# Patient Record
Sex: MALE | Race: WHITE | NOT HISPANIC OR LATINO | URBAN - METROPOLITAN AREA
[De-identification: names, ages, dates, MRNs, and addresses within clinical notes are randomized per-mention and may not be internally consistent; named-entity substitution may affect disease eponyms.]

---

## 2023-04-27 ENCOUNTER — APPOINTMENT (OUTPATIENT)
Dept: RADIOLOGY | Facility: MEDICAL CENTER | Age: 53
End: 2023-04-27
Attending: EMERGENCY MEDICINE

## 2023-04-27 ENCOUNTER — HOSPITAL ENCOUNTER (EMERGENCY)
Facility: MEDICAL CENTER | Age: 53
End: 2023-04-28
Attending: EMERGENCY MEDICINE
Payer: COMMERCIAL

## 2023-04-27 DIAGNOSIS — F10.920 ALCOHOLIC INTOXICATION WITHOUT COMPLICATION (HCC): ICD-10-CM

## 2023-04-27 PROCEDURE — 99285 EMERGENCY DEPT VISIT HI MDM: CPT

## 2023-04-27 PROCEDURE — 94760 N-INVAS EAR/PLS OXIMETRY 1: CPT

## 2023-04-27 PROCEDURE — 70450 CT HEAD/BRAIN W/O DYE: CPT

## 2023-04-27 ASSESSMENT — FIBROSIS 4 INDEX: FIB4 SCORE: 0.97

## 2023-04-28 VITALS
BODY MASS INDEX: 30.06 KG/M2 | SYSTOLIC BLOOD PRESSURE: 133 MMHG | OXYGEN SATURATION: 96 % | TEMPERATURE: 98.9 F | DIASTOLIC BLOOD PRESSURE: 98 MMHG | WEIGHT: 210 LBS | HEIGHT: 70 IN | HEART RATE: 103 BPM | RESPIRATION RATE: 18 BRPM

## 2023-04-28 PROCEDURE — A9270 NON-COVERED ITEM OR SERVICE: HCPCS | Performed by: EMERGENCY MEDICINE

## 2023-04-28 PROCEDURE — 96374 THER/PROPH/DIAG INJ IV PUSH: CPT

## 2023-04-28 PROCEDURE — 700111 HCHG RX REV CODE 636 W/ 250 OVERRIDE (IP): Performed by: EMERGENCY MEDICINE

## 2023-04-28 PROCEDURE — 700102 HCHG RX REV CODE 250 W/ 637 OVERRIDE(OP): Performed by: EMERGENCY MEDICINE

## 2023-04-28 RX ORDER — LORAZEPAM 1 MG/1
0.5 TABLET ORAL EVERY 4 HOURS PRN
Status: DISCONTINUED | OUTPATIENT
Start: 2023-04-28 | End: 2023-04-28 | Stop reason: HOSPADM

## 2023-04-28 RX ORDER — LORAZEPAM 2 MG/ML
1 INJECTION INTRAMUSCULAR
Status: DISCONTINUED | OUTPATIENT
Start: 2023-04-28 | End: 2023-04-28 | Stop reason: HOSPADM

## 2023-04-28 RX ORDER — CHLORDIAZEPOXIDE HYDROCHLORIDE 25 MG/1
25 CAPSULE, GELATIN COATED ORAL 4 TIMES DAILY PRN
Qty: 12 CAPSULE | Refills: 0 | Status: SHIPPED | OUTPATIENT
Start: 2023-04-28 | End: 2023-05-01

## 2023-04-28 RX ORDER — LORAZEPAM 1 MG/1
1 TABLET ORAL EVERY 4 HOURS PRN
Status: DISCONTINUED | OUTPATIENT
Start: 2023-04-28 | End: 2023-04-28 | Stop reason: HOSPADM

## 2023-04-28 RX ORDER — CHLORDIAZEPOXIDE HYDROCHLORIDE 25 MG/1
25 CAPSULE, GELATIN COATED ORAL 3 TIMES DAILY PRN
Qty: 30 CAPSULE | Refills: 0 | Status: SHIPPED | OUTPATIENT
Start: 2023-04-28 | End: 2023-04-28 | Stop reason: SDUPTHER

## 2023-04-28 RX ORDER — LORAZEPAM 2 MG/ML
0.5 INJECTION INTRAMUSCULAR EVERY 4 HOURS PRN
Status: DISCONTINUED | OUTPATIENT
Start: 2023-04-28 | End: 2023-04-28 | Stop reason: HOSPADM

## 2023-04-28 RX ORDER — LORAZEPAM 2 MG/ML
2 INJECTION INTRAMUSCULAR
Status: DISCONTINUED | OUTPATIENT
Start: 2023-04-28 | End: 2023-04-28 | Stop reason: HOSPADM

## 2023-04-28 RX ORDER — LORAZEPAM 1 MG/1
1 TABLET ORAL ONCE
Status: COMPLETED | OUTPATIENT
Start: 2023-04-28 | End: 2023-04-28

## 2023-04-28 RX ORDER — LORAZEPAM 2 MG/ML
1 INJECTION INTRAMUSCULAR ONCE
Status: COMPLETED | OUTPATIENT
Start: 2023-04-28 | End: 2023-04-28

## 2023-04-28 RX ORDER — LORAZEPAM 2 MG/1
4 TABLET ORAL
Status: DISCONTINUED | OUTPATIENT
Start: 2023-04-28 | End: 2023-04-28 | Stop reason: HOSPADM

## 2023-04-28 RX ORDER — LORAZEPAM 2 MG/1
2 TABLET ORAL
Status: DISCONTINUED | OUTPATIENT
Start: 2023-04-28 | End: 2023-04-28 | Stop reason: HOSPADM

## 2023-04-28 RX ORDER — LORAZEPAM 2 MG/ML
1.5 INJECTION INTRAMUSCULAR
Status: DISCONTINUED | OUTPATIENT
Start: 2023-04-28 | End: 2023-04-28 | Stop reason: HOSPADM

## 2023-04-28 RX ADMIN — LORAZEPAM 1 MG: 1 TABLET ORAL at 06:19

## 2023-04-28 RX ADMIN — LORAZEPAM 1 MG: 2 INJECTION INTRAMUSCULAR; INTRAVENOUS at 08:09

## 2023-04-28 RX ADMIN — LORAZEPAM 1 MG: 1 TABLET ORAL at 04:25

## 2023-04-28 ASSESSMENT — LIFESTYLE VARIABLES
NAUSEA AND VOMITING: MILD NAUSEA WITH NO VOMITING
PAROXYSMAL SWEATS: BARELY PERCEPTIBLE SWEATING. PALMS MOIST
ANXIETY: NO ANXIETY (AT EASE)
VISUAL DISTURBANCES: MILD SENSITIVITY
ORIENTATION AND CLOUDING OF SENSORIUM: DATE DISORIENTATION BY MORE THAN TWO CALENDAR DAYS
TOTAL SCORE: 8
TOTAL SCORE: 13
AGITATION: MODERATELY FIDGETY AND RESTLESS
ANXIETY: NO ANXIETY (AT EASE)
TOTAL SCORE: 23
ORIENTATION AND CLOUDING OF SENSORIUM: DATE DISORIENTATION BY NO MORE THAN TWO CALENDAR DAYS
NAUSEA AND VOMITING: MILD NAUSEA WITH NO VOMITING
AUDITORY DISTURBANCES: NOT PRESENT
HEADACHE, FULLNESS IN HEAD: MODERATE
TREMOR: *
VISUAL DISTURBANCES: MODERATELY SEVERE HALLUCINATIONS
PAROXYSMAL SWEATS: BARELY PERCEPTIBLE SWEATING. PALMS MOIST
HEADACHE, FULLNESS IN HEAD: MODERATE
AUDITORY DISTURBANCES: NOT PRESENT
TREMOR: TREMOR NOT VISIBLE BUT CAN BE FELT, FINGERTIP TO FINGERTIP
AUDITORY DISTURBANCES: NOT PRESENT
ANXIETY: *
PAROXYSMAL SWEATS: NO SWEAT VISIBLE
AGITATION: NORMAL ACTIVITY
AGITATION: NORMAL ACTIVITY
NAUSEA AND VOMITING: *
ORIENTATION AND CLOUDING OF SENSORIUM: DATE DISORIENTATION BY NO MORE THAN TWO CALENDAR DAYS
VISUAL DISTURBANCES: MODERATELY SEVERE HALLUCINATIONS
TREMOR: NO TREMOR
HEADACHE, FULLNESS IN HEAD: MILD

## 2023-04-28 NOTE — ED NOTES
Pt given d/c instructions, f/u info and aware of RX x 1 for  with verbal understanding.  VSS at discharge.  PIV d/c'd with tip intact.  Pt ambulatory from the ED w/ steady gait.  All belongings in possession on discharge.  Pt escorted to the lobby by RN.

## 2023-04-28 NOTE — ED NOTES
Pt lying right lateral side, head of bed elevated. Patient appears nauseous. Given emesis bag. Pt remains lethargic, slow to arouse, alert to person. Within view or RN station.

## 2023-04-28 NOTE — ED NOTES
Pt lying in bed, left side, sleeping, visible chest rise and fall. Within view of nursing station.

## 2023-04-28 NOTE — ED NOTES
Pt lying right lateral side, head of bed elevated, eyes closed, sleeping. Within view RN station.

## 2023-04-28 NOTE — ED NOTES
Pt sitting up. Anxious and restless, shaking arms in flexed position. Reports he is feeling really anxious. Hyperventilating. ERP notified.

## 2023-04-28 NOTE — ED TRIAGE NOTES
"Chief Complaint   Patient presents with    Alcohol Intoxication     Pt drank 3 bottles of [place] over the past 2-3 days. Pt's wife called EMS because pt was hard to wake up. Pt arrives lethargic, arousable to physical stimulation, and oriented to self only. Pt breathing with even, unlabored respirations on 2L NC.       Pt BIB EMS with above complaints. Pt has history of BPD and depression. Per wife on scene, pt frequent has benders similar to this. Pt saturating above 90% on 2L NC.     BP (!) 125/91   Pulse (!) 101   Temp 36.9 °C (98.5 °F)   Resp 14   Ht 1.778 m (5' 10\")   Wt 95.3 kg (210 lb)   SpO2 95% Comment: 2L  BMI 30.13 kg/m²     "

## 2023-04-28 NOTE — ED NOTES
Pt medicated per MAR with JERMAINE Cam. Pt states he feels better. Pt cooperative, sipping water. Lies back to bed and closes eyes. Within view of RN station.

## 2023-04-28 NOTE — ED NOTES
"ERP notified pt states he is \"detoxing.\" States he is anxious and seeing bodies, sitting upright in bed, restless, complaining of nausea. ERP at bedside.   "

## 2023-04-28 NOTE — ED NOTES
Patient sits upright in bed, uncoordinated movements. Unsteady, unable to ambulate. Follows commands. Drinks cup of water. Complains of pain all over. Pulled off BP cuff, NC, and pulse ox. Spoke to wife on phone. States he had stopped drinking for a long time and started yesterday after he was kicked out of his house. Placed on pulse oximeter. Sat 90% RA.

## 2023-04-28 NOTE — ED NOTES
Patient takes few sips of water, turns to left lateral side, eyes closed. Phone by side. Within view of nursing station.

## 2023-04-28 NOTE — DISCHARGE SUMMARY
"  ED Observation Discharge Summary    Patient:Norbert Martin  Patient : 1970  Patient MRN: [id number]  Patient PCP: Norbert Alberts M.D.    Admit Date: 2023  Discharge Date and Time: 23 9:24 AM  Discharge Diagnosis: Alcohol intoxication  Discharge Attending: Donald Haley M.D.  Discharge Service: ED Observation    ED Course  Norbert is a 52 y.o. male who was evaluated at [facility] with alcohol intoxication.  He was altered obtain CT scan of the head.  He sobered in the department.  He ended up having some degree of withdrawal and was given sedative.  This seemed to help.  He reports he was feeling much better he he is feeling much better.  He is no longer tremulous.  He has a plan to get into a ketamine clinic.  [facility]  He does not want to go to acute inpatient treatment.  At this point outpatient management does appear to be a reasonable option.  I have given a prescription for a few Librium tablets to help with withdrawal symptoms.  He states he is not suicidal homicidal.  No hallucination.  He has a plan and appropriate support system.  He will be discharged to return to the ER for any concerning medical symptoms.    Discharge Exam:  /89   Pulse (!) 106   Temp 36.2 °C (97.2 °F) (Temporal)   Resp 16   Ht 1.778 m (5' 10\")   Wt 95.3 kg (210 lb)   SpO2 94%   BMI 30.13 kg/m² .    Constitutional: Awake and alert. Nontoxic  HENT:  Grossly normal  Eyes: Grossly normal  Neck: Normal range of motion  Cardiovascular: Normal heart rate   Thorax & Lungs: No respiratory distress  Abdomen: Nontender  Skin:  No pathologic rash.   Extremities: Well perfused  Psychiatric: Affect normal    Labs      Radiology  CT-HEAD W/O   Final Result         1.  No acute intracranial abnormality is identified, there are nonspecific white matter changes, commonly associated with small vessel ischemic disease.  Associated mild cerebral atrophy is noted. "   2.  Low-density fluid collection the right posterior fossa, location and appearance favors arachnoid cyst.             Medications:   New Prescriptions    CHLORDIAZEPOXIDE (LIBRIUM) 25 MG CAP    Take 1 Capsule by mouth 3 times a day as needed for Anxiety for up to 3 days.         Upon Reevaluation, the patient's condition has: Improved; and will be discharged.    Patient discharged from ED Observation status at 9:30 AM (Time) 4/28/2023 (Date).     Total time spent on this ED Observation discharge encounter is < 30 Minutes    Electronically signed by: Donald Haley M.D., 4/28/2023 7:24 AM

## 2023-04-28 NOTE — ED NOTES
Pt medicated per MAR with JERMAINE Cam. Pt swallows pill and drinks water without difficulty. Pt lies to bed and closes eyes.

## 2023-04-28 NOTE — ED NOTES
Pt sits upright, moves to edge of bed, becomes anxious and states he wants to ambulate to restroom. Pt attempted to stand. Becomes shaky and unable to stand upright. Follows command to sit back in bed. Given warm blanket, water and crackers. Pt urinates to urinal, eats crackers and drinks water.

## 2023-04-28 NOTE — ED PROVIDER NOTES
"ED Provider Note    CHIEF COMPLAINT  Chief Complaint   Patient presents with    Alcohol Intoxication     Pt drank 3 bottles of [place] over the past 2-3 days. Pt's wife called EMS because pt was hard to wake up. Pt arrives lethargic, arousable to physical stimulation, and oriented to self only. Pt breathing with even, unlabored respirations on 2L NC.         HPI/ROS  LIMITATION TO HISTORY   Select: Intoxication    Norbert Martin is a 52 y.o. male who presents for evaluation of alcohol intoxication.  He comes in by ambulance, EMS reports that the wife states that he drank 3 bottles of [place] over the past couple of days.  There is been no history of trauma.  Wife was concerned that it was hard to wake him up.  The patient is arousable, denies any head injury, has no headache or neck pain or back pain or chest pain or abdominal pain.  No other complaints or history available    PAST MEDICAL HISTORY   has a past medical history of Bipolar affective disorder (HCC), Pain (2013), and Psychotic depressive reaction (HCC).    SURGICAL HISTORY   has a past surgical history that includes tonsillectomy and adenoidectomy (1974); knee arthroscopy (1996); and knee arthroplasty total (3/13/2013).    FAMILY HISTORY  No family history on file.    SOCIAL HISTORY  Social History     Tobacco Use    Smoking status: Never    Smokeless tobacco: Never   Substance and Sexual Activity    Alcohol use: Yes     Comment: \"3 drinks per month\"    Drug use: No    Sexual activity: Not on file       CURRENT MEDICATIONS  Home Medications    **Home medications have not yet been reviewed for this encounter**         ALLERGIES  No Known Allergies    PHYSICAL EXAM  VITAL SIGNS: BP (!) 125/91   Pulse (!) 101   Temp 36.9 °C (98.5 °F)   Resp 14   Ht 1.778 m (5' 10\")   Wt 95.3 kg (210 lb)   SpO2 95% Comment: 2L  BMI 30.13 kg/m²    Constitutional: Sleepy, does wake up to loud verbal stimulation.  HENT: Normocephalic, no obvious evidence of acute " trauma.  Eyes: No scleral icterus. Normal conjunctiva   Thorax & Lungs: Normal nonlabored respirations. I appreciate no wheezing, rhonchi or rales. There is normal air movement.  Upon cardiac ascultation I appreciate a regular heart rhythm and a normal rate.   Abdomen: The abdomen is not visibly distended. Upon palpation, I find it to be without tenderness.  No mass appreciated.  Skin: The exposed portions of skin reveal no obvious rash or other abnormalities.  Extremities/Musculoskeletal: No obvious sign of acute trauma. No asymmetric calf tenderness or edema.   Neurologic: Once he is woken up he is very drowsy and quickly falls back asleep, he does however follow commands and has no focal deficit    DIAGNOSTIC STUDIES / PROCEDURES    RADIOLOGY  I have independently interpreted the diagnostic imaging associated with this visit and am waiting the final reading from the radiologist.   My preliminary interpretation is as follows: No intracranial hemorrhage  Radiologist interpretation:   CT-HEAD W/O    (Results Pending)   Pending at the time of dictation      COURSE & MEDICAL DECISION MAKING    ED Observation Status? Yes; I am placing the patient in to an observation status due to a diagnostic uncertainty as well as therapeutic intensity. Patient placed in observation status at 7:20 PM, 4/27/2023.     Observation plan is as follows: Head CT followed by observation until clinically sober      INITIAL ASSESSMENT, COURSE AND PLAN  Care Narrative: This is a 52-year-old male who presents with the history compatible with alcohol intoxication.  No sign of head trauma.  No focal neurologic complaints or findings on exam, really no other pertinent findings on exam.  He is sleepy but arousable to verbal stimulation.  He does not have any presentations in our emergency department alcohol intoxication, for that reason head CT is obtained which is negative for acute intracranial abnormality.  At this point the patient will be  observed here in the emergency department until he is clinically sober at which time he will be reevaluated for disposition, admitted to ED observation.        8:06 PM: Patient reevaluated, resting comfortably, remains arousable.  He will be signed out to the oncoming physician pending sobriety and reevaluation, remaining in ED observation.        Working diagnosis  1. Alcoholic intoxication without complication (HCC)           Electronically signed by: Roberto Brennan M.D., 4/27/2023 7:38 PM

## 2024-06-23 ENCOUNTER — OFFICE VISIT (OUTPATIENT)
Dept: URGENT CARE | Facility: CLINIC | Age: 54
End: 2024-06-23
Payer: COMMERCIAL

## 2024-06-23 VITALS
HEART RATE: 78 BPM | TEMPERATURE: 97.8 F | OXYGEN SATURATION: 96 % | DIASTOLIC BLOOD PRESSURE: 84 MMHG | WEIGHT: 214 LBS | SYSTOLIC BLOOD PRESSURE: 138 MMHG | BODY MASS INDEX: 28.36 KG/M2 | HEIGHT: 73 IN | RESPIRATION RATE: 16 BRPM

## 2024-06-23 DIAGNOSIS — M10.9 ACUTE GOUT INVOLVING TOE OF RIGHT FOOT, UNSPECIFIED CAUSE: ICD-10-CM

## 2024-06-23 PROBLEM — E53.8 VITAMIN B12 DEFICIENCY: Status: ACTIVE | Noted: 2024-06-23

## 2024-06-23 PROBLEM — E55.9 VITAMIN D DEFICIENCY: Status: ACTIVE | Noted: 2024-06-23

## 2024-06-23 PROBLEM — I73.9 PERIPHERAL ARTERIAL DISEASE (HCC): Status: ACTIVE | Noted: 2023-07-08

## 2024-06-23 PROBLEM — R13.10 DYSPHAGIA: Status: ACTIVE | Noted: 2024-06-23

## 2024-06-23 PROBLEM — I74.9 ARTERIAL THROMBOSIS (HCC): Status: ACTIVE | Noted: 2024-06-23

## 2024-06-23 PROBLEM — F32.9 MAJOR DEPRESSIVE DISORDER WITHOUT PSYCHOTIC FEATURES: Status: ACTIVE | Noted: 2022-11-24

## 2024-06-23 PROBLEM — R93.89 ABNORMAL MRI: Status: ACTIVE | Noted: 2021-02-25

## 2024-06-23 PROBLEM — F60.3 BORDERLINE PERSONALITY DISORDER (HCC): Status: ACTIVE | Noted: 2023-07-08

## 2024-06-23 PROBLEM — F34.1 DYSTHYMIC DISORDER: Status: ACTIVE | Noted: 2022-03-30

## 2024-06-23 PROBLEM — L91.8 SKIN TAG: Status: ACTIVE | Noted: 2024-06-23

## 2024-06-23 PROBLEM — F10.20 ALCOHOL DEPENDENCE (HCC): Status: ACTIVE | Noted: 2024-06-23

## 2024-06-23 PROBLEM — F10.129 ALCOHOL ABUSE WITH INTOXICATION (HCC): Status: ACTIVE | Noted: 2023-07-08

## 2024-06-23 PROBLEM — F10.14 ALCOHOL ABUSE WITH ALCOHOL-INDUCED MOOD DISORDER (HCC): Status: ACTIVE | Noted: 2024-06-23

## 2024-06-23 PROBLEM — M25.532 PAIN IN LEFT WRIST: Status: ACTIVE | Noted: 2024-06-23

## 2024-06-23 PROBLEM — I10 HYPERTENSION: Status: ACTIVE | Noted: 2023-07-08

## 2024-06-23 PROBLEM — F43.12 POST-TRAUMATIC STRESS DISORDER, CHRONIC: Status: ACTIVE | Noted: 2024-06-23

## 2024-06-23 PROBLEM — D22.5 MELANOCYTIC NEVI OF TRUNK: Status: ACTIVE | Noted: 2024-06-23

## 2024-06-23 PROBLEM — F10.20 ALCOHOL DEPENDENCE, UNCOMPLICATED (HCC): Status: ACTIVE | Noted: 2024-06-23

## 2024-06-23 PROBLEM — F33.0 RECURRENT MAJOR DEPRESSIVE EPISODES, MILD (HCC): Status: ACTIVE | Noted: 2024-06-23

## 2024-06-23 PROBLEM — M50.30 OTHER CERVICAL DISC DEGENERATION, UNSPECIFIED CERVICAL REGION: Status: ACTIVE | Noted: 2024-06-23

## 2024-06-23 PROBLEM — E78.5 HYPERLIPIDEMIA: Status: ACTIVE | Noted: 2023-07-08

## 2024-06-23 PROBLEM — Z96.642 STATUS POST TOTAL HIP REPLACEMENT, LEFT: Status: ACTIVE | Noted: 2021-03-09

## 2024-06-23 PROBLEM — I10 ESSENTIAL (PRIMARY) HYPERTENSION: Status: ACTIVE | Noted: 2024-06-23

## 2024-06-23 PROBLEM — F11.23 OPIOID DEPENDENCE WITH WITHDRAWAL (HCC): Status: ACTIVE | Noted: 2023-07-08

## 2024-06-23 PROBLEM — R45.851 SUICIDAL IDEATION: Status: ACTIVE | Noted: 2021-10-26

## 2024-06-23 PROBLEM — F10.10 ALCOHOL ABUSE: Status: ACTIVE | Noted: 2022-03-29

## 2024-06-23 PROBLEM — E78.5 HYPERLIPIDEMIA, UNSPECIFIED: Status: ACTIVE | Noted: 2024-06-23

## 2024-06-23 PROBLEM — R06.83 SNORING: Status: ACTIVE | Noted: 2024-06-23

## 2024-06-23 PROBLEM — L03.90 CELLULITIS, UNSPECIFIED: Status: ACTIVE | Noted: 2024-06-23

## 2024-06-23 PROBLEM — F33.1 MAJOR DEPRESSIVE DISORDER, RECURRENT, MODERATE (HCC): Status: ACTIVE | Noted: 2024-06-23

## 2024-06-23 PROBLEM — I73.9 PERIPHERAL VASCULAR DISEASE, UNSPECIFIED (HCC): Status: ACTIVE | Noted: 2024-06-23

## 2024-06-23 PROBLEM — M79.671 PAIN IN RIGHT FOOT: Status: ACTIVE | Noted: 2024-06-23

## 2024-06-23 PROBLEM — R42 VERTIGO: Status: ACTIVE | Noted: 2023-12-26

## 2024-06-23 PROBLEM — M65.842 OTHER SYNOVITIS AND TENOSYNOVITIS, LEFT HAND: Status: ACTIVE | Noted: 2024-06-23

## 2024-06-23 PROCEDURE — 99213 OFFICE O/P EST LOW 20 MIN: CPT

## 2024-06-23 PROCEDURE — 3079F DIAST BP 80-89 MM HG: CPT

## 2024-06-23 PROCEDURE — 3075F SYST BP GE 130 - 139MM HG: CPT

## 2024-06-23 RX ORDER — COLCHICINE 0.6 MG/1
1.2 TABLET ORAL
COMMUNITY
Start: 2024-06-21 | End: 2024-06-23

## 2024-06-23 RX ORDER — METHYLPREDNISOLONE 4 MG/1
TABLET ORAL
Qty: 21 TABLET | Refills: 0 | Status: SHIPPED | OUTPATIENT
Start: 2024-06-23

## 2024-06-23 RX ORDER — COLCHICINE 0.6 MG/1
TABLET ORAL
Qty: 6 TABLET | Refills: 1 | Status: SHIPPED | OUTPATIENT
Start: 2024-06-23

## 2024-06-23 ASSESSMENT — FIBROSIS 4 INDEX: FIB4 SCORE: 1.33

## 2024-06-23 NOTE — PROGRESS NOTES
Subjective:   Norbert Martin is a 53 y.o. male who presents for Foot Pain (R foot pain, swollen x 2 days )      HPI:    Patient presents to urgent care with concerns of gout flare  He reports right great toe pain, onset was two days ago  Endorses history of gout and chronic use of allopurinol  Has tried ice packs, ibuprofen, dietary changes  Reports colchicine worked well the last time he had flare  He has PCP appointment next week.  Denies fever, chills  Denies traumatic injuries    ROS As above in HPI    Medications:    Current Outpatient Medications on File Prior to Visit   Medication Sig Dispense Refill    allopurinol (ZYLOPRIM) 300 MG Tab 300 mg.      ezetimibe (ZETIA) 10 MG Tab Take 10 mg by mouth every day.      sildenafil citrate (VIAGRA) 100 MG tablet 50 mg.      Cholecalciferol 1000 UNIT Cap 100 mcg.      Cyanocobalamin 1000 MCG Cap 1,000 mcg.      lisinopril (PRINIVIL) 10 MG Tab 10 mg.      oxycodone-acetaminophen (PERCOCET) 5-325 MG TABS Take 1-2 Tabs by mouth every four hours as needed. Last at 1:30PM       aspirin EC (ECOTRIN) 325 MG TBEC Take 325 mg by mouth 2 Times a Day.        alprazolam (XANAX) 0.5 MG TABS Take 0.5 mg by mouth every four hours as needed. 1-2 tabs   Indications: Feeling Anxious      oxycodone, immediate release, (ROXICODONE) 5 MG TABS Take 5 mg by mouth every four hours as needed. If percocet ineffective       ibuprofen (MOTRIN) 800 MG TABS Take 800 mg by mouth every 8 hours as needed.         No current facility-administered medications on file prior to visit.        Allergies:   Pravastatin, Rosuvastatin, Atorvastatin, and Trazodone    Problem List:   Patient Active Problem List   Diagnosis    Osteoarthrosis involving lower leg    Left knee DJD    Abnormal MRI    Alcohol abuse    Alcohol dependence (HCC)    Alcohol abuse with alcohol-induced mood disorder (HCC)    Alcohol dependence, uncomplicated (HCC)    Alcohol abuse with intoxication (HCC)    Arterial thrombosis (HCC)     "Borderline personality disorder (HCC)    Cellulitis, unspecified    Dysphagia    Dysthymic disorder    Major depressive disorder without psychotic features    Gout, unspecified    Gout    Hyperlipidemia, unspecified    Hyperlipidemia    Essential (primary) hypertension    Peripheral vascular disease, unspecified (HCC)    Hypertension    Major depressive disorder, recurrent, moderate (HCC)    Peripheral arterial disease (HCC)    Recurrent major depressive episodes, mild (HCC)    Melanocytic nevi of trunk    Opioid dependence with withdrawal (HCC)    Other cervical disc degeneration, unspecified cervical region    Other synovitis and tenosynovitis, left hand    Pain in left wrist    Pain in right foot    Post-traumatic stress disorder, chronic    Skin tag    Snoring    Status post total hip replacement, left    Suicidal ideation    Vertigo    Vitamin B12 deficiency    Vitamin D deficiency        Surgical History:  Past Surgical History:   Procedure Laterality Date    KNEE ARTHROPLASTY TOTAL  3/13/2013    Performed by Alex Miramontes M.D. at [facility]    KNEE ARTHROSCOPY  1996    TONSILLECTOMY AND ADENOIDECTOMY  1974       Past Social Hx:   Social History     Tobacco Use    Smoking status: Never    Smokeless tobacco: Never   Substance Use Topics    Alcohol use: Yes     Comment: \"3 drinks per month\"    Drug use: No          Problem list, medications, and allergies reviewed by myself today in Epic.     Objective:     /84 (BP Location: Right arm, Patient Position: Sitting, BP Cuff Size: Adult)   Pulse 78   Temp 36.6 °C (97.8 °F) (Temporal)   Resp 16   Ht 1.854 m (6' 1\")   Wt 97.1 kg (214 lb)   SpO2 96%   BMI 28.23 kg/m²     Physical Exam  Vitals and nursing note reviewed.   Constitutional:       Appearance: Normal appearance.   Cardiovascular:      Rate and Rhythm: Normal rate and regular rhythm.      Heart sounds: Normal heart sounds.   Pulmonary:      Effort: Pulmonary effort is normal.      " Breath sounds: Normal breath sounds.   Abdominal:      General: Bowel sounds are normal.      Palpations: Abdomen is soft.   Musculoskeletal:         General: Tenderness and signs of injury present. No swelling or deformity.        Feet:    Feet:      Comments: Right first MTP joint has erythema, swelling, tenderness palpation, reduced range of motion due to pain.  Dorsal surface of distal great toe is firm, chalky nodules.  They are not tender to palpation.    Foot is otherwise free of edema, erythema, warmth, and tenderness to palpation.   Skin:     Capillary Refill: Capillary refill takes less than 2 seconds.      Findings: No erythema.   Neurological:      Mental Status: He is alert and oriented to person, place, and time.         Assessment/Plan:       Diagnosis and associated orders:   1. Acute gout involving toe of right foot, unspecified cause  - methylPREDNISolone (MEDROL DOSEPAK) 4 MG Tablet Therapy Pack; Follow schedule on package instructions.  Dispense: 21 Tablet; Refill: 0  - colchicine (COLCRYS) 0.6 MG Tab; 1.2mg PO at first sign of flare, then 0.6mg 1 hr later; not to exceed 1.8mg in a 1 hr period.  Dispense: 6 Tablet; Refill: 1      Comments/MDM:     Recommend decrease meat, seafood consumption, avoid alcohol use.   Will start him on colchicine, he reports Medrol Dose packs also provides alleviation of his discomfort in the past as well.  Already on allopurinol may need to adjust dose. Has follow-up with his primary care this week.        Return to clinic or go to ED if symptoms worsen or persist. Indications for ED discussed at length. Patient/Parent/Guardian voices understanding. Follow-up with your primary care provider in 3-5 days. Red flag symptoms discussed. All side effects of medication discussed including allergic response, GI upset, tendon injury, rash, sedation etc.    Please note that this dictation was created using voice recognition software. I have made a reasonable attempt to correct  obvious errors, but I expect that there are errors of grammar and possibly content that I did not discover before finalizing the note.    This note was electronically signed by CUONG Ayala